# Patient Record
Sex: FEMALE | Race: WHITE | NOT HISPANIC OR LATINO | Employment: OTHER | URBAN - METROPOLITAN AREA
[De-identification: names, ages, dates, MRNs, and addresses within clinical notes are randomized per-mention and may not be internally consistent; named-entity substitution may affect disease eponyms.]

---

## 2018-08-02 ENCOUNTER — CONTACT LENS EXAM ESTABLISHED (OUTPATIENT)
Dept: URBAN - METROPOLITAN AREA CLINIC 35 | Facility: CLINIC | Age: 33
End: 2018-08-02

## 2018-08-02 DIAGNOSIS — H52.13: ICD-10-CM

## 2018-08-02 PROCEDURE — 92310-1 LEVEL 1 CONTACT LENS MANAGEMENT

## 2018-08-02 PROCEDURE — 92014 COMPRE OPH EXAM EST PT 1/>: CPT

## 2018-08-02 PROCEDURE — 92015 DETERMINE REFRACTIVE STATE: CPT

## 2018-08-02 ASSESSMENT — TONOMETRY
OD_IOP_MMHG: 13
OS_IOP_MMHG: 13

## 2019-11-27 ENCOUNTER — CONTACT LENS EXAM ESTABLISHED (OUTPATIENT)
Dept: URBAN - METROPOLITAN AREA CLINIC 35 | Facility: CLINIC | Age: 34
End: 2019-11-27

## 2019-11-27 DIAGNOSIS — H52.13: ICD-10-CM

## 2019-11-27 PROCEDURE — 92014 COMPRE OPH EXAM EST PT 1/>: CPT

## 2019-11-27 PROCEDURE — 92310-1 LEVEL 1 CONTACT LENS MANAGEMENT

## 2019-11-27 PROCEDURE — 92015 DETERMINE REFRACTIVE STATE: CPT

## 2019-11-27 ASSESSMENT — KERATOMETRY
OD_K2POWER_DIOPTERS: 44.25
OS_AXISANGLE_DEGREES: 174
OD_AXISANGLE2_DEGREES: 79
OD_K1POWER_DIOPTERS: 43.5
OS_AXISANGLE2_DEGREES: 84
OS_K2POWER_DIOPTERS: 43.75
OD_AXISANGLE_DEGREES: 169
OS_K1POWER_DIOPTERS: 43.25

## 2019-11-27 ASSESSMENT — TONOMETRY
OS_IOP_MMHG: 13
OD_IOP_MMHG: 13

## 2019-11-27 ASSESSMENT — VISUAL ACUITY
OD_CC: J1 CLS
OS_CC: 20/25 CLS
OD_CC: 20/20 CLS
OS_CC: J1 CLS

## 2021-03-31 ENCOUNTER — RX CHANGE - NO APPT (OUTPATIENT)
Dept: URBAN - METROPOLITAN AREA CLINIC 35 | Facility: CLINIC | Age: 36
End: 2021-03-31

## 2021-03-31 ASSESSMENT — KERATOMETRY
OS_K2POWER_DIOPTERS: 43.75
OD_AXISANGLE2_DEGREES: 79
OD_K1POWER_DIOPTERS: 43.5
OS_K1POWER_DIOPTERS: 43.25
OS_AXISANGLE_DEGREES: 174
OD_K2POWER_DIOPTERS: 44.25
OD_AXISANGLE_DEGREES: 169
OS_AXISANGLE2_DEGREES: 84